# Patient Record
(demographics unavailable — no encounter records)

---

## 2024-11-14 NOTE — PLAN
[FreeTextEntry1] : stress management and porstate ca discussed upcoming appointment' chart and labs reviewed

## 2024-11-14 NOTE — HISTORY OF PRESENT ILLNESS
[Home] : at home, [unfilled] , at the time of the visit. [Medical Office: ([facility])___] : at the medical office located in  [Verbal consent obtained from patient] : the patient, [unfilled] [FreeTextEntry1] : med renewal [de-identified] : under stress at work prostate ca with recurrence

## 2024-12-16 NOTE — PHYSICAL EXAM
Communicate risk of Fall with Harm to all staff, patient, and family/Provide visual cue: red socks, yellow wristband, yellow gown, etc/Reinforce activity limits and safety measures with patient and family/Bed in lowest position, wheels locked, appropriate side rails in place/Call bell, personal items and telephone in reach/Instruct patient to call for assistance before getting out of bed/chair/stretcher/Non-slip footwear applied when patient is off stretcher/[place] to call system/Physically safe environment - no spills, clutter or unnecessary equipment/Purposeful Proactive Rounding/Room/bathroom lighting operational, light cord in reach [Normal] : oriented to person, place and time, the affect was normal, the mood was normal and not anxious

## 2024-12-16 NOTE — REVIEW OF SYSTEMS
[Anal Pain: Grade 0] : Anal Pain: Grade 0 [Constipation: Grade 0] : Constipation: Grade 0 [Diarrhea: Grade 0] : Diarrhea: Grade 0 [Dyspepsia: Grade 1 - Mild symptoms; intervention not indicated] : Dyspepsia: Grade 1 - Mild symptoms; intervention not indicated [Dysphagia: Grade 0] : Dysphagia: Grade 0 [Esophagitis: Grade 0] : Esophagitis: Grade 0 [Fecal Incontinence: Grade 0] : Fecal Incontinence: Grade 0 [Gastroparesis: Grade 0] : Gastroparesis: Grade 0 [Nausea: Grade 0] : Nausea: Grade 0 [Proctitis: Grade 1 - Rectal discomfort, intervention not indicated] : Proctitis: Grade 1 - Rectal discomfort, intervention not indicated [Rectal Pain: Grade 0] : Rectal Pain: Grade 0 [Small Intestinal Obstruction: Grade 0] : Small Intestinal Obstruction: Grade 0 [Vomiting: Grade 0] : Vomiting: Grade 0 [Hematuria: Grade 0] : Hematuria: Grade 0 [Urinary Incontinence: Grade 0] : Urinary Incontinence: Grade 0  [Urinary Retention: Grade 0] : Urinary Retention: Grade 0 [Urinary Tract Pain: Grade 0] : Urinary Tract Pain: Grade 0 [Urinary Urgency: Grade 0] : Urinary Urgency: Grade 0 [Urinary Frequency: Grade 0] : Urinary Frequency: Grade 0 [Erectile Dysfunction: Grade 1 - Decrease in erectile function (frequency or rigidity of erections) but intervention not indicated (e.g., medication or use of mechanical device, penile pump)] : Erectile Dysfunction: Grade 1 - Decrease in erectile function (frequency or rigidity of erections) but intervention not indicated (e.g., medication or use of mechanical device, penile pump)

## 2024-12-17 NOTE — DISEASE MANAGEMENT
[1] : T1 [c] : c [0-10] : 0 -10 ng/mL [Biopsy with Fusion] : Patient had a biopsy with fusion on [Biopsy results sent to PCP/Referring Physician] : Biopsy results sent to PCP/Referring Physician [] : Patient had a Prostate MRI [4] : 4 [IIC] : IIC [Radical Prostatectomy] : Radical Prostatectomy [Patient had a radical prostatectomy] : Patient had a radical prostatectomy  [8] : [place] Score 8 [Negative] : Negative margins [2] : T2 [0] : N0 [X] : MX [Pathological] : TNM Stage: p [MeasuredProstateVolume] : 37 [TotalCores] : 14 [TotalPositiveCores] : 3 [MaxCoreInvolvement] : 60 [RadicalProstatectomyDate] : 9/15/2021 [TotalNumberofnodesresected] : 17 [PositiveNodes] : 0 [FreeTextEntry1] : PSA bartolo to 0.2 on 1/10/23; recelved salvage RT.  [RecurrenceDate] : 1/10/23 [TTNM] : 2 [NTNM] : 0 [MTNM] : x

## 2024-12-17 NOTE — HISTORY OF PRESENT ILLNESS
[FreeTextEntry1] : Jona Xie returns to the office today.  He is 63 years old status post radical prostatectomy in September 2021. Pathology was Jackelin 4+4 disease.  He developed biochemical recurrence and was subsequently treated with radiation therapy.  His most recent PSA level was done in August 2024 and is undetectable at that time.  He had also seen Dr. Padron for management of postoperative erectile dysfunction.  He had tried oral medication without much success.  He had a good erection with an intracavernosal injection but ultimately did not feel comfortable doing this at home.  He feels it to be too awkward and would like to seek another solution.

## 2024-12-17 NOTE — DISEASE MANAGEMENT
[1] : T1 [c] : c [0-10] : 0 -10 ng/mL [Biopsy with Fusion] : Patient had a biopsy with fusion on [Biopsy results sent to PCP/Referring Physician] : Biopsy results sent to PCP/Referring Physician [] : Patient had a Prostate MRI [4] : 4 [IIC] : IIC [Radical Prostatectomy] : Radical Prostatectomy [Patient had a radical prostatectomy] : Patient had a radical prostatectomy  [8] : [place] Score 8 [Negative] : Negative margins [2] : T2 [0] : N0 [X] : MX [Pathological] : TNM Stage: p [MeasuredProstateVolume] : 37 [TotalCores] : 14 [TotalPositiveCores] : 3 [MaxCoreInvolvement] : 60 [RadicalProstatectomyDate] : 9/15/2021 [TotalNumberofnodesresected] : 17 [PositiveNodes] : 0 [FreeTextEntry1] : PSA bartolo to 0.2 on 1/10/23; recelved salvage RT.  [RecurrenceDate] : 1/10/23 [TTNM] : 2 [NTNM] : 0 [MTNM] : x

## 2024-12-17 NOTE — DISEASE MANAGEMENT
[1] : T1 [c] : c [0-10] : 0 -10 ng/mL [Biopsy with Fusion] : Patient had a biopsy with fusion on [Biopsy results sent to PCP/Referring Physician] : Biopsy results sent to PCP/Referring Physician [] : Patient had a Prostate MRI [4] : 4 [IIC] : IIC [Radical Prostatectomy] : Radical Prostatectomy [Patient had a radical prostatectomy] : Patient had a radical prostatectomy  [8] : [place] Score 8 [Negative] : Negative margins [2] : T2 [0] : N0 [X] : MX [Pathological] : TNM Stage: p [MeasuredProstateVolume] : 37 [TotalCores] : 14 [TotalPositiveCores] : 3 [MaxCoreInvolvement] : 60 [RadicalProstatectomyDate] : 9/15/2021 [TotalNumberofnodesresected] : 17 [PositiveNodes] : 0 [FreeTextEntry1] : PSA bartolo to 0.2 on 1/10/23; recelved salvage RT.  [RecurrenceDate] : 1/10/23 [TTNM] : 2 [NTNM] : 0 [MTNM] : x

## 2025-01-22 NOTE — CURRENT MEDS
[Takes medication as prescribed] : takes [None] : Patient does not have any barriers to medication adherence [Benzodiazepines] : benzodiazepines [Sedatives] : sedatives

## 2025-01-22 NOTE — PLAN
[FreeTextEntry1] : Annual Had flu shot colon UTD  bp good on med gerd on med doing well stress discussed on statin check lipid and lft  EKG WNL

## 2025-01-22 NOTE — HEALTH RISK ASSESSMENT
[Very Good] : ~his/her~  mood as very good [Yes] : Yes [Never (0 pts)] : Never (0 points) [No falls in past year] : Patient reported no falls in the past year [0] : 2) Feeling down, depressed, or hopeless: Not at all (0) [PHQ-2 Negative - No further assessment needed] : PHQ-2 Negative - No further assessment needed [[id number]] : 0 [Former] : Former [5-9] : 5-9 [> 15 Years] : > 15 Years [NO] : No [Change in mental status noted] : No change in mental status noted [Language] : denies difficulty with language [Behavior] : denies difficulty with behavior [Learning/Retaining New Information] : denies difficulty learning/retaining new information [Handling Complex Tasks] : denies difficulty handling complex tasks [Reasoning] : denies difficulty with reasoning [Spatial Ability and Orientation] : denies difficulty with spatial ability and orientation [None] : None [Alone] : lives alone [Employed] : employed [Single] : single [Feels Safe at Home] : Feels safe at home [Fully functional (bathing, dressing, toileting, transferring, walking, feeding)] : Fully functional (bathing, dressing, toileting, transferring, walking, feeding) [Fully functional (using the telephone, shopping, preparing meals, housekeeping, doing laundry, using] : Fully functional and needs no help or supervision to perform IADLs (using the telephone, shopping, preparing meals, housekeeping, doing laundry, using transportation, managing medications and managing finances) [Reports changes in hearing] : Reports no changes in hearing [Reports changes in vision] : Reports no changes in vision [Reports changes in dental health] : Reports no changes in dental health [Smoke Detector] : smoke detector [Seat Belt] :  uses seat belt

## 2025-01-22 NOTE — HISTORY OF PRESENT ILLNESS
[FreeTextEntry1] : Annual [de-identified] : Annual Had flu shot colon utd  s/p prostate ca with relapse now in remission ED still an issue under severe stress from work

## 2025-02-21 NOTE — REVIEW OF SYSTEMS
[Anal Pain: Grade 0] : Anal Pain: Grade 0 [Constipation: Grade 0] : Constipation: Grade 0 [Diarrhea: Grade 0] : Diarrhea: Grade 0 [Dysphagia: Grade 0] : Dysphagia: Grade 0 [Esophagitis: Grade 0] : Esophagitis: Grade 0 [Fecal Incontinence: Grade 0] : Fecal Incontinence: Grade 0 [Gastroparesis: Grade 0] : Gastroparesis: Grade 0 [Nausea: Grade 0] : Nausea: Grade 0 [Rectal Pain: Grade 0] : Rectal Pain: Grade 0 [Small Intestinal Obstruction: Grade 0] : Small Intestinal Obstruction: Grade 0 [Vomiting: Grade 0] : Vomiting: Grade 0 [Hematuria: Grade 0] : Hematuria: Grade 0 [Urinary Incontinence: Grade 0] : Urinary Incontinence: Grade 0  [Urinary Retention: Grade 0] : Urinary Retention: Grade 0 [Urinary Tract Pain: Grade 0] : Urinary Tract Pain: Grade 0 [Urinary Urgency: Grade 0] : Urinary Urgency: Grade 0 [Urinary Frequency: Grade 0] : Urinary Frequency: Grade 0 [Erectile Dysfunction: Grade 1 - Decrease in erectile function (frequency or rigidity of erections) but intervention not indicated (e.g., medication or use of mechanical device, penile pump)] : Erectile Dysfunction: Grade 1 - Decrease in erectile function (frequency or rigidity of erections) but intervention not indicated (e.g., medication or use of mechanical device, penile pump) [Dyspepsia: Grade 0] : Dyspepsia: Grade 0 [Proctitis: Grade 0] : Proctitis: Grade 0

## 2025-02-27 NOTE — HISTORY OF PRESENT ILLNESS
[FreeTextEntry1] : 63-year-old male with biochemically recurrent prostate cancer, s/p prostatectomy on 9/15/21 with Dr. Carmona. Pathology from his surgery showed pT2N0 R0 G8(4+4) disease, 0/17 nodes involved. PSA was 4.85 prior to surgery; initially undetectable post-prostatectomy, later rising to 0.2 on 1/10/23. Decipher 0.36, low risk.  Baseline IPSS/QOL/EPIC Score 4/2/12. Initially planned for salvage radiation therapy to the prostate bed with 6 months ADT, but decided to forego ADT.  Radiation Treatment Summary:  Prostate Bed  6,250 cGy in 25 fx from 3/27/2023 - 4/28/2023 Clinical Course: tolerated the treatment well and developed the expected side effects, including increased fatigue, bowel gassiness/bloating, hemorrhoidal irritation, increased bladder/pelvic pressure and urgency, and increased nocturia.  PTE 6/7/23 Reports overall improvement in fatigue &  symptoms almost at baseline. Nocturia 2x, good stream. Resumed coffee and regular diet.  Denies dysuria, dribbling, hematuria or bowel symptoms.  Hemorrhoids better. Not sexually active. IPSS/QOL/EPIC score 9/2/18   Follow up 9/26/23: Reports improvement in  symptoms at baseline with nocturia 2x, good stream, rare post void dribbling. Doing Kegels intermittently. Denies dysuria, hematuria or bowel symptoms other than some constipation. Erections suitable for masturbation only. Reports some erection with meds prior to surgery. Tried sildenafil a few months ago without success. Considering penile injections. Significant stress at work (several co-workers have resigned) and he is not getting much exercise; hasn't been able to get to support groups due to technical issues.  IPSS/QOL/EPIC score 5/3/18.   PSA trend:  iPSA preop 4.85 -> s/p prostatectomy on 9/15/21  0.20 1/1/23 -> RT complete 4/28/2023 (ADT declined) 0.02 8/7/23 <0.01 9/23/23 <0.01 2/3/24  Follow Up 2/6/24. He continues to follow Dr. Carmona (Urologist) . Started injections with benefit for erections, although he feels uncomfortable with these and thinks that he is doing them wrong.  Patient reports nocturia 2-3 times per night, occasional frequency due to coffee intake and occasional dribbling, does not wear pad.  He endorsed occasional depression and lack of energy, although recently had a great trip to [place] and skiing in [place]. He denies any other urinary or bowel issues other than some upper abdominal gas pains, and he is not sexually active. Tired and stressed from work although recently promoted.  IPSS/QOL/EPIC score: 8/3/9   8/16/2024 Follow up: 8/4/2024 PSA  <0.01. Nocturia 2-3. c/o rectal gas, mucus and occasional bloody stool. Pt continues to follow with Dr Carmona. Pt's following with Dr. Padron and to address his erectile dysfunction. May consider referring Pt to PM&R for rectal and ED problems   2/21/25 Patient presents for follow up. Last PSA done on 2/16/25 < 0.01. He denies any other urinary or bowel issue. Continues to follow up with  with Dr Carmona seen on 12/17/24

## 2025-02-27 NOTE — PHYSICAL EXAM
[Normal] : normal skin color and pigmentation and no rash [No Focal Deficits] : no focal deficits [Sensation] : the sensory exam was normal to light touch and pinprick

## 2025-02-27 NOTE — DISEASE MANAGEMENT
[1] : T1 [c] : c [0-10] : 0 -10 ng/mL [Biopsy with Fusion] : Patient had a biopsy with fusion on [8] : Fusion Biopsy Jackelin Score: 8 [Biopsy results sent to PCP/Referring Physician] : Biopsy results sent to PCP/Referring Physician [] : Patient had a Prostate MRI [4] : 4 [Pathological] : TNM Stage: p [IIC] : IIC [MeasuredProstateVolume] : 37 [TotalCores] : 14 [TotalPositiveCores] : 3 [MaxCoreInvolvement] : 60 [TTNM] : 2 [NTNM] : 0 [MTNM] : x [FreeTextEntry1] : PSA bartolo to 0.2 on 1/10/23; recelved salvage RT.  [RecurrenceDate] : 1/10/23

## 2025-02-27 NOTE — HISTORY OF PRESENT ILLNESS
[FreeTextEntry1] : 63-year-old male with biochemically recurrent prostate cancer, s/p prostatectomy on 9/15/21 with Dr. Carmona. Pathology from his surgery showed pT2N0 R0 G8(4+4) disease, 0/17 nodes involved. PSA was 4.85 prior to surgery; initially undetectable post-prostatectomy, later rising to 0.2 on 1/10/23. Decipher 0.36, low risk.  Baseline IPSS/QOL/EPIC Score 4/2/12. Initially planned for salvage radiation therapy to the prostate bed with 6 months ADT, but decided to forego ADT.  Radiation Treatment Summary:  Prostate Bed  6,250 cGy in 25 fx from 3/27/2023 - 4/28/2023 Clinical Course: tolerated the treatment well and developed the expected side effects, including increased fatigue, bowel gassiness/bloating, hemorrhoidal irritation, increased bladder/pelvic pressure and urgency, and increased nocturia.  PTE 6/7/23 Reports overall improvement in fatigue &  symptoms almost at baseline. Nocturia 2x, good stream. Resumed coffee and regular diet.  Denies dysuria, dribbling, hematuria or bowel symptoms.  Hemorrhoids better. Not sexually active. IPSS/QOL/EPIC score 9/2/18   Follow up 9/26/23: Reports improvement in  symptoms at baseline with nocturia 2x, good stream, rare post void dribbling. Doing Kegels intermittently. Denies dysuria, hematuria or bowel symptoms other than some constipation. Erections suitable for masturbation only. Reports some erection with meds prior to surgery. Tried sildenafil a few months ago without success. Considering penile injections. Significant stress at work (several co-workers have resigned) and he is not getting much exercise; hasn't been able to get to support groups due to technical issues.  IPSS/QOL/EPIC score 5/3/18.   PSA trend:  iPSA preop 4.85 -> s/p prostatectomy on 9/15/21  0.20 1/1/23 -> RT complete 4/28/2023 (ADT declined) 0.02 8/7/23 <0.01 9/23/23 <0.01 2/3/24  Follow Up 2/6/24. He continues to follow Dr. Carmona (Urologist) . Started injections with benefit for erections, although he feels uncomfortable with these and thinks that he is doing them wrong.  Patient reports nocturia 2-3 times per night, occasional frequency due to coffee intake and occasional dribbling, does not wear pad.  He endorsed occasional depression and lack of energy, although recently had a great trip to [place] and skiing in [place]. He denies any other urinary or bowel issues other than some upper abdominal gas pains, and he is not sexually active. Tired and stressed from work although recently promoted.  IPSS/QOL/EPIC score: 8/3/9   8/16/2024 Follow up: 8/4/2024 PSA  <0.01. Nocturia 2-3. c/o rectal gas, mucus and occasional bloody stool. Pt continues to follow with Dr Carmona. Pt's following with Dr. Padron and to address his erectile dysfunction. May consider referring Pt to PM&R for rectal and ED problems   2/21/25 Patient presents for follow up. Last PSA done on 2/16/25 < 0.01. He denies any other urinary or bowel issue. Continues to follow up with  with Dr Carmona seen on 12/17/24

## 2025-04-18 NOTE — PHYSICAL EXAM
[General Appearance - Well Developed] : well developed [Normal Appearance] : normal appearance [General Appearance - In No Acute Distress] : no acute distress [] : no respiratory distress [Exaggerated Use Of Accessory Muscles For Inspiration] : no accessory muscle use [Scrotum] : the scrotum was normal [Testes Tenderness] : no tenderness of the testes [de-identified] : stretched penile length demonstrated to pt - length of erect penis [Testes Mass (___cm)] : there were no testicular masses [Chaperone Present] : A chaperone was present in the examining room during all aspects of the physical examination [FreeTextEntry2] : Dr Mike Camacho

## 2025-04-18 NOTE — HISTORY OF PRESENT ILLNESS
[FreeTextEntry1] : Patient is a 64 yo M who presents for post-prostatectomy/salvage radiation erectile dysfunction.   Prior hx: He has h/o recurrent prostate cancer, s/p prostatectomy on 9/15/21 with Dr. Carmona. Pathology from his surgery showed pT2N0 R0 G8(4+4) disease, 0/17 nodes involved. Salvage radiation completed 4/28/23. He had prior oral meds for ED - tried sildenafil 100 mg without success. Also tried generic levitra 20 mg without success. He was given ICI trimix - he had good response in the office. But he did not feel comfortable performing by himself and also he does not feel it would an easy process given he does not have a regular partner. He is more open to sexual activity now. But he is feeling a little depressed about injections.  Interval hx: 6/7/2024 He has been taking cialis 5 mg daily and took cialis 20 mg x 3 times with partial response, rigidity 4/10, not sufficient for penetration.  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 He is under significant stress overall. He tried ICI 20 unit several months ago with good response, but he is afraid to use injections. He does not have a significant other/partner at this time and just had dental procedure recently. He is not diabetic, but with hyperglycemia.  He does not exercise regularly.    4/18/25 Here for f/u.  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  He still has some urinary and fecal urgency.  From ED standpoint, he reports insufficient response to oral cialis.  He is not interested in injections due to pain/difficulty.  He is considering IPP and wishes to learn more. Seeing Dr Carmona for CaP follow up.  Recent PSA 2/16/25 <0.01.

## 2025-04-18 NOTE — ASSESSMENT
[FreeTextEntry1] : Patient is a 62 yo M who presents for post-prostatectomy/salvage radiation erectile dysfunction. Failed pills, injection. CaP - on surveillance with Onc, recent PSA undetectable.  Discussed at length the penile prosthesis surgery, which patient is interested in.  Discussed that there is a malleable prosthesis as well as inflatable prosthesis.  Patient is more interested in the inflatable penile prosthesis.  Discussed risks/benefits/alternatives of procedure and typical postoperative course including no use/sex for 4-6 wks after surgery.  Risks including but not limited to bleeding, infection, penile length shortening, device malfunction, erosion, perforation, and postoperative pain discussed at length.  I also emphasized that this is a permanent ED procedure, pt understands that he cannot resume oral or injectable medication for erections after prosthesis surgery. Pt also understands that the typical life expectancy of the device is ~10 years and that it may need to be revised in the future.  Discussed with pt should have no impact on sensation, but will not provide feeling of arousal/blood rushing into penis. Also d/w pt that use of CONCHIS preop for penile lengthening/preservation.  He wishes to think further Additional info/websites given  He will call to schedule IPP - will plan for BS/AMS device  I have spent 30 minutes of time on the encounter including reviewing prior records, discussing with pt the above condition and various treatment options, and documentation.

## 2025-06-12 NOTE — HISTORY OF PRESENT ILLNESS
[FreeTextEntry1] : Jona Xie returns to the office today.  He is 63 years old status post radical prostatectomy in September 2021. Pathology was Jackelin 4+4 disease.  He developed biochemical recurrence and was subsequently treated with radiation therapy.  His most recent PSA level was done in February 2025 was undetectable at that time.  He had also seen Dr. Padron for management of postoperative erectile dysfunction.  He had tried oral medication without much success.  He had a good erection with an intracavernosal injection but ultimately did not feel comfortable doing this at home.  Looking to schedule IPP.

## 2025-06-12 NOTE — DISEASE MANAGEMENT
[1] : T1 [c] : c [0-10] : 0 -10 ng/mL [Biopsy with Fusion] : Patient had a biopsy with fusion on [Biopsy results sent to PCP/Referring Physician] : Biopsy results sent to PCP/Referring Physician [] : Patient had a Prostate MRI [4] : 4 [MeasuredProstateVolume] : 37 [TotalCores] : 14 [TotalPositiveCores] : 3 [MaxCoreInvolvement] : 60 [IIC] : IIC [Radical Prostatectomy] : Radical Prostatectomy [Patient had a radical prostatectomy] : Patient had a radical prostatectomy  [8] : [place] Score 8 [Negative] : Negative margins [2] : T2 [0] : N0 [X] : MX [RadicalProstatectomyDate] : 9/15/2021 [TotalNumberofnodesresected] : 17 [PositiveNodes] : 0 [FreeTextEntry1] : PSA bartolo to 0.2 on 1/10/23; recelved salvage RT.  [RecurrenceDate] : 1/10/23 [Pathological] : TNM Stage: p [TTNM] : 2 [NTNM] : 0 [MTNM] : x

## 2025-06-12 NOTE — DISEASE MANAGEMENT
[1] : T1 [c] : c [0-10] : 0 -10 ng/mL [Biopsy with Fusion] : Patient had a biopsy with fusion on [Biopsy results sent to PCP/Referring Physician] : Biopsy results sent to PCP/Referring Physician [] : Patient had a Prostate MRI [4] : 4 [MeasuredProstateVolume] : 37 [TotalCores] : 14 [TotalPositiveCores] : 3 [MaxCoreInvolvement] : 60 [IIC] : IIC [Radical Prostatectomy] : Radical Prostatectomy [Patient had a radical prostatectomy] : Patient had a radical prostatectomy  [8] : [place] Score 8 [Negative] : Negative margins [2] : T2 [0] : N0 [X] : MX [RadicalProstatectomyDate] : 9/15/2021 [TotalNumberofnodesresected] : 17 [PositiveNodes] : 0 [FreeTextEntry1] : PSA bartolo to 0.2 on 1/10/23; recelved salvage RT.  [Pathological] : TNM Stage: p [RecurrenceDate] : 1/10/23 [TTNM] : 2 [NTNM] : 0 [MTNM] : x